# Patient Record
Sex: MALE | Race: WHITE | URBAN - METROPOLITAN AREA
[De-identification: names, ages, dates, MRNs, and addresses within clinical notes are randomized per-mention and may not be internally consistent; named-entity substitution may affect disease eponyms.]

---

## 2023-10-19 ENCOUNTER — EMERGENCY (EMERGENCY)
Facility: HOSPITAL | Age: 48
LOS: 1 days | Discharge: HOME | End: 2023-10-19
Attending: EMERGENCY MEDICINE
Payer: SELF-PAY

## 2023-10-19 VITALS
SYSTOLIC BLOOD PRESSURE: 122 MMHG | OXYGEN SATURATION: 99 % | DIASTOLIC BLOOD PRESSURE: 68 MMHG | WEIGHT: 130.07 LBS | RESPIRATION RATE: 18 BRPM | HEART RATE: 71 BPM | TEMPERATURE: 98 F | HEIGHT: 67 IN

## 2023-10-19 PROCEDURE — 99283 EMERGENCY DEPT VISIT LOW MDM: CPT

## 2023-10-19 PROCEDURE — 99285 EMERGENCY DEPT VISIT HI MDM: CPT

## 2023-10-19 NOTE — ED PROVIDER NOTE - CLINICAL SUMMARY MEDICAL DECISION MAKING FREE TEXT BOX
47-year-old male patient presents to the ED complain of alcohol intoxication.  Patient admits to drinking and having a few beers prior to arrival.  Denies any current complaints.  No falls, no head trauma, no chest pain or shortness of breath.  No abdominal pain.  Patient ambulating well, states that he wishes to go home.  Patient states that he has safe ride home and will get a taxi with the money that he has.  Patient instructed to return for any falls, headache or dizziness, chest pain or shortness of breath, abdominal pain.  Patient educated on alcohol abuse, resources available if he needs them.  Patient given strict return precautions.  Patient agrees to plan of care 47-year-old male patient presents to the ED complain of alcohol intoxication.  Patient admits to drinking and having a few beers prior to arrival.  Denies any current complaints.  No falls, no head trauma, no chest pain or shortness of breath.  No abdominal pain.  Patient ambulating well, VSS, AAOx3, states that he wishes to go home.  Patient states that he has safe ride home and will get a taxi with the money that he has.  Patient instructed to return for any falls, headache or dizziness, chest pain or shortness of breath, abdominal pain.  Patient educated on alcohol abuse, resources available if he needs them.  Patient given strict return precautions.  Patient agrees to plan of care

## 2023-10-19 NOTE — ED PROVIDER NOTE - ATTENDING CONTRIBUTION TO CARE
Cristal: I performed a face to face bedside interview with patient regarding history of present illness, review of symptoms and past medical history. I completed an independent physical exam.  I have discussed patient's plan of care with resident.   I agree with note as stated above including HISTORY OF PRESENT ILLNESS, HIV, PAST MEDICAL/SURGICAL/FAMILY/SOCIAL HISTORY, ALLERGIES AND HOME MEDICATIONS, REVIEW OF SYSTEMS, PHYSICAL EXAM, MEDICAL DECISION MAKING and any PROGRESS NOTES during the time I functioned as the attending physician for this patient unless otherwise noted. My brief assessment is as follows: 47-year-old male patient presents to the ED complain of alcohol intoxication.  Patient admits to drinking and having a few beers prior to arrival.  Denies any current complaints.  No falls, no head trauma, no chest pain or shortness of breath.  No abdominal pain.  Patient ambulating well, VSS, AAOx3, states that he wishes to go home.  Patient states that he has safe ride home and will get a taxi with the money that he has.  Patient instructed to return for any falls, headache or dizziness, chest pain or shortness of breath, abdominal pain.  Patient educated on alcohol abuse, resources available if he needs them.  Patient given strict return precautions.  Patient agrees to plan of care

## 2023-10-19 NOTE — ED PROVIDER NOTE - OBJECTIVE STATEMENT
47-year-old male patient with a hx of pancreatitis presents ED complaining of alcohol intoxication.  Patient was found outside [address] by a bystander, intoxicated, was brought in by EMS for evaluation.  In the ED, patient admits to drinking alcohol, denies any head injury or loss of consciousness, chest pain or shortness of breath, nausea vomiting, abdominal pain.  Patient states that he has been able to ambulate and wishes to go home.  Denies any current complaints at this time.

## 2023-10-19 NOTE — ED ADULT TRIAGE NOTE - CHIEF COMPLAINT QUOTE
Pt BIBA from [address], bystander called 911, pt admits to drinking alcohol, offers no complaints, placed into yellow gown for pt safety

## 2023-10-19 NOTE — ED PROVIDER NOTE - PATIENT PORTAL LINK FT
You can access the FollowMyHealth Patient Portal offered by [facility] by registering at the following website: [email or web address]. By joining [facility]’s FollowMyHealth portal, you will also be able to view your health information using other applications (apps) compatible with our system.

## 2023-10-19 NOTE — ED PROVIDER NOTE - PHYSICAL EXAMINATION
Const: Pt is well appearing. Awake, alert and oriented to person, place, & time. In no acute distress. Speaking full sentences.  Clear thought.  HEENT: NC/AT. Moist mucous membranes.  Eyes: PERRLA. No scleral icterus. EOMI.  Neck:. Soft and supple. Full ROM without pain. No cervical midline tenderness.   Cardiac: Regular rate and regular rhythm. +S1/S2. Peripheral pulses 2+ and symmetric. No LE edema.  Resp: Speaking in full sentences, breath sounds equal and clear bilaterally. No wheezes, rales or rhonchi.  Abd: Soft, non-tender, non-distended. Normal bowel sounds in all 4 quadrants. No guarding or rebound.  MSK: Spine midline and non-tender. No CVAT.  Skin: No rashes, abrasions or lacerations.  Neuro: Moves all extremities symmetrically. No motor or sensory deficits. Ambulating well without assistance